# Patient Record
Sex: MALE | Race: WHITE | NOT HISPANIC OR LATINO
[De-identification: names, ages, dates, MRNs, and addresses within clinical notes are randomized per-mention and may not be internally consistent; named-entity substitution may affect disease eponyms.]

---

## 2017-09-07 ENCOUNTER — RESULT REVIEW (OUTPATIENT)
Age: 77
End: 2017-09-07

## 2022-11-18 ENCOUNTER — OFFICE (OUTPATIENT)
Dept: URBAN - METROPOLITAN AREA CLINIC 70 | Facility: CLINIC | Age: 82
Setting detail: OPHTHALMOLOGY
End: 2022-11-18
Payer: MEDICARE

## 2022-11-18 DIAGNOSIS — H35.373: ICD-10-CM

## 2022-11-18 DIAGNOSIS — H16.223: ICD-10-CM

## 2022-11-18 DIAGNOSIS — H44.23: ICD-10-CM

## 2022-11-18 DIAGNOSIS — H31.002: ICD-10-CM

## 2022-11-18 DIAGNOSIS — H35.3131: ICD-10-CM

## 2022-11-18 DIAGNOSIS — Z96.1: ICD-10-CM

## 2022-11-18 DIAGNOSIS — H52.7: ICD-10-CM

## 2022-11-18 DIAGNOSIS — H40.013: ICD-10-CM

## 2022-11-18 DIAGNOSIS — H33.8: ICD-10-CM

## 2022-11-18 DIAGNOSIS — H35.54: ICD-10-CM

## 2022-11-18 PROCEDURE — 92014 COMPRE OPH EXAM EST PT 1/>: CPT | Performed by: OPHTHALMOLOGY

## 2022-11-18 PROCEDURE — 92015 DETERMINE REFRACTIVE STATE: CPT | Performed by: OPHTHALMOLOGY

## 2022-11-18 PROCEDURE — 92250 FUNDUS PHOTOGRAPHY W/I&R: CPT | Performed by: OPHTHALMOLOGY

## 2022-11-18 ASSESSMENT — REFRACTION_AUTOREFRACTION
OD_CYLINDER: -1.50
OD_SPHERE: -1.75
OS_SPHERE: -2.75
OD_AXIS: 87
OS_AXIS: 124
OS_CYLINDER: -1.25

## 2022-11-18 ASSESSMENT — TONOMETRY
OS_IOP_MMHG: 16
OD_IOP_MMHG: 17

## 2022-11-18 ASSESSMENT — SPHEQUIV_DERIVED
OS_SPHEQUIV: -3.625
OD_SPHEQUIV: -2.75
OD_SPHEQUIV: -2.5
OS_SPHEQUIV: -3.5
OS_SPHEQUIV: -3.375
OD_SPHEQUIV: -2.75

## 2022-11-18 ASSESSMENT — AXIALLENGTH_DERIVED
OS_AL: 25.1399
OD_AL: 24.7143
OD_AL: 24.7143
OS_AL: 25.1953
OD_AL: 24.6079
OS_AL: 25.251

## 2022-11-18 ASSESSMENT — KERATOMETRY
OD_AXISANGLE_DEGREES: 175
OS_K2POWER_DIOPTERS: 43.50
OS_K1POWER_DIOPTERS: 42.75
OD_K1POWER_DIOPTERS: 42.75
OD_K2POWER_DIOPTERS: 44.25
OS_AXISANGLE_DEGREES: 072

## 2022-11-18 ASSESSMENT — REFRACTION_MANIFEST
OD_ADD: +2.75
OD_VA2: 20/30
OS_VA2: 20/30
OS_SPHERE: -3.00
OU_VA: 20/40
OS_ADD: +2.75
OD_CYLINDER: -1.50
OD_ADD: +2.50
OD_SPHERE: -2.00
OD_VA1: 20/40+2
OD_AXIS: 075
OD_CYLINDER: -1.50
OS_VA1: 20/50+2
OD_VA1: 20/40+2
OD_AXIS: 90
OS_AXIS: 125
OS_CYLINDER: -1.00
OD_SPHERE: -2.00
OS_VA1: 20/40
OS_AXIS: 140
OS_SPHERE: -3.25
OS_CYLINDER: -0.75
OS_ADD: +2.50

## 2022-11-18 ASSESSMENT — REFRACTION_CURRENTRX
OS_VPRISM_DIRECTION: BF
OD_AXIS: 73
OD_AXIS: 70
OS_CYLINDER: -0.50
OS_AXIS: 141
OD_VPRISM_DIRECTION: BF
OS_OVR_VA: 20/
OD_SPHERE: -2.00
OD_VPRISM_DIRECTION: BF
OS_OVR_VA: 20/
OS_AXIS: 140
OD_OVR_VA: 20/
OS_ADD: +2.50
OD_OVR_VA: 20/
OS_CYLINDER: -0.75
OD_CYLINDER: -1.50
OD_ADD: +2.50
OD_CYLINDER: -1.00
OD_ADD: +2.50
OS_SPHERE: -3.25
OD_SPHERE: -2.00
OS_SPHERE: -3.25
OS_ADD: +2.50

## 2022-11-18 ASSESSMENT — SUPERFICIAL PUNCTATE KERATITIS (SPK)
OD_SPK: 1+
OS_SPK: 1+

## 2022-11-18 ASSESSMENT — VISUAL ACUITY
OS_BCVA: 20/60+2
OD_BCVA: 20/60

## 2022-11-18 ASSESSMENT — CONFRONTATIONAL VISUAL FIELD TEST (CVF)
OS_FINDINGS: FULL
OD_FINDINGS: FULL

## 2023-11-21 ENCOUNTER — OFFICE (OUTPATIENT)
Dept: URBAN - METROPOLITAN AREA CLINIC 109 | Facility: CLINIC | Age: 83
Setting detail: OPHTHALMOLOGY
End: 2023-11-21
Payer: MEDICARE

## 2023-11-21 DIAGNOSIS — Z96.1: ICD-10-CM

## 2023-11-21 DIAGNOSIS — H35.373: ICD-10-CM

## 2023-11-21 DIAGNOSIS — H33.8: ICD-10-CM

## 2023-11-21 DIAGNOSIS — H35.3132: ICD-10-CM

## 2023-11-21 DIAGNOSIS — H40.023: ICD-10-CM

## 2023-11-21 PROCEDURE — 76514 ECHO EXAM OF EYE THICKNESS: CPT | Performed by: OPHTHALMOLOGY

## 2023-11-21 PROCEDURE — 92014 COMPRE OPH EXAM EST PT 1/>: CPT | Performed by: OPHTHALMOLOGY

## 2023-11-21 ASSESSMENT — REFRACTION_CURRENTRX
OD_AXIS: 70
OD_OVR_VA: 20/
OS_AXIS: 130
OD_OVR_VA: 20/
OS_AXIS: 140
OS_CYLINDER: -1.25
OS_OVR_VA: 20/
OS_SPHERE: -3.25
OD_ADD: +2.50
OS_ADD: +2.50
OS_SPHERE: -3.50
OD_CYLINDER: -1.75
OD_AXIS: 70
OS_OVR_VA: 20/
OS_ADD: +2.50
OD_VPRISM_DIRECTION: BF
OD_SPHERE: -2.00
OD_SPHERE: -2.00
OD_VPRISM_DIRECTION: BF
OD_CYLINDER: -1.50
OS_CYLINDER: -0.75
OS_VPRISM_DIRECTION: BF
OD_ADD: +2.50

## 2023-11-21 ASSESSMENT — REFRACTION_MANIFEST
OS_CYLINDER: -0.50
OS_VA1: 20/40
OD_VA2: 20/30
OS_ADD: +2.50
OD_ADD: +2.50
OD_AXIS: 90
OD_VA1: 20/40+
OS_ADD: +2.75
OU_VA: 20/40
OD_CYLINDER: -1.50
OD_ADD: +2.75
OS_SPHERE: -3.00
OD_SPHERE: -1.75
OS_VA2: 20/30
OS_AXIS: 130

## 2023-11-21 ASSESSMENT — REFRACTION_AUTOREFRACTION
OD_CYLINDER: -1.50
OS_CYLINDER: -0.50
OS_AXIS: 131
OD_AXIS: 90
OS_SPHERE: -3.00
OD_SPHERE: -1.75

## 2023-11-21 ASSESSMENT — CONFRONTATIONAL VISUAL FIELD TEST (CVF)
OS_FINDINGS: FULL
OD_FINDINGS: FULL

## 2023-11-21 ASSESSMENT — SPHEQUIV_DERIVED
OS_SPHEQUIV: -3.25
OD_SPHEQUIV: -2.5
OD_SPHEQUIV: -2.5
OS_SPHEQUIV: -3.25

## 2024-08-29 PROBLEM — Z87.09 HISTORY OF SINUSITIS: Status: RESOLVED | Noted: 2024-08-29 | Resolved: 2024-08-29

## 2024-08-29 PROBLEM — Z86.69 HISTORY OF OBSTRUCTIVE SLEEP APNEA: Status: RESOLVED | Noted: 2024-08-29 | Resolved: 2024-08-29

## 2024-08-29 PROBLEM — Z85.828: Status: RESOLVED | Noted: 2024-08-29 | Resolved: 2024-08-29

## 2024-09-03 ENCOUNTER — APPOINTMENT (OUTPATIENT)
Dept: OTOLARYNGOLOGY | Facility: CLINIC | Age: 84
End: 2024-09-03

## 2024-09-03 VITALS
OXYGEN SATURATION: 96 % | HEIGHT: 74 IN | SYSTOLIC BLOOD PRESSURE: 125 MMHG | DIASTOLIC BLOOD PRESSURE: 64 MMHG | HEART RATE: 69 BPM | BODY MASS INDEX: 33.37 KG/M2 | WEIGHT: 260 LBS

## 2024-09-03 DIAGNOSIS — J39.2 OTHER DISEASES OF PHARYNX: ICD-10-CM

## 2024-09-03 DIAGNOSIS — N40.0 BENIGN PROSTATIC HYPERPLASIA WITHOUT LOWER URINARY TRACT SYMPMS: ICD-10-CM

## 2024-09-03 DIAGNOSIS — Z87.09 PERSONAL HISTORY OF OTHER DISEASES OF THE RESPIRATORY SYSTEM: ICD-10-CM

## 2024-09-03 DIAGNOSIS — K86.2 CYST OF PANCREAS: ICD-10-CM

## 2024-09-03 DIAGNOSIS — Z86.69 PERSONAL HISTORY OF OTHER DISEASES OF THE NERVOUS SYSTEM AND SENSE ORGANS: ICD-10-CM

## 2024-09-03 DIAGNOSIS — Z86.79 PERSONAL HISTORY OF OTHER DISEASES OF THE CIRCULATORY SYSTEM: ICD-10-CM

## 2024-09-03 DIAGNOSIS — Z78.9 OTHER SPECIFIED HEALTH STATUS: ICD-10-CM

## 2024-09-03 DIAGNOSIS — Z85.828 PERSONAL HISTORY OF OTHER MALIGNANT NEOPLASM OF SKIN: ICD-10-CM

## 2024-09-03 PROCEDURE — 99203 OFFICE O/P NEW LOW 30 MIN: CPT | Mod: 25

## 2024-09-03 PROCEDURE — 31575 DIAGNOSTIC LARYNGOSCOPY: CPT

## 2024-09-03 RX ORDER — SACUBITRIL AND VALSARTAN 49; 51 MG/1; MG/1
TABLET, FILM COATED ORAL
Refills: 0 | Status: ACTIVE | COMMUNITY

## 2024-09-03 RX ORDER — FINASTERIDE 1 MG/1
TABLET ORAL
Refills: 0 | Status: ACTIVE | COMMUNITY

## 2024-09-03 RX ORDER — DULOXETINE HYDROCHLORIDE 30 MG/1
CAPSULE, DELAYED RELEASE ORAL
Refills: 0 | Status: ACTIVE | COMMUNITY

## 2024-09-03 RX ORDER — LOSARTAN POTASSIUM 100 MG/1
TABLET, FILM COATED ORAL
Refills: 0 | Status: ACTIVE | COMMUNITY

## 2024-09-03 RX ORDER — RIVAROXABAN 2.5 MG/1
TABLET, FILM COATED ORAL
Refills: 0 | Status: ACTIVE | COMMUNITY

## 2024-09-03 RX ORDER — TAMSULOSIN HYDROCHLORIDE 0.4 MG/1
CAPSULE ORAL
Refills: 0 | Status: ACTIVE | COMMUNITY

## 2024-09-03 NOTE — HISTORY OF PRESENT ILLNESS
[de-identified] : Pt with hx of SCCa of right central scalp  on RT 29/30 ( complete  9/4/2024) by Roel Young and refer by Janna Chapa for pet ct uptake on 8/2024 showed right oropharynx reveal mild asymmetric uptake. PT has no throat pain, swallow issue.   non smoker

## 2024-09-03 NOTE — HISTORY OF PRESENT ILLNESS
[de-identified] : Pt with hx of SCCa of right central scalp  on RT 29/30 ( complete  9/4/2024) by Roel Young and refer by Janna Chapa for pet ct uptake on 8/2024 showed right oropharynx reveal mild asymmetric uptake. PT has no throat pain, swallow issue.   non smoker

## 2024-09-03 NOTE — CONSULT LETTER
[Dear  ___] : Dear ~OLU, [Consult Letter:] : I had the pleasure of evaluating your patient, [unfilled]. [Please see my note below.] : Please see my note below. [Consult Closing:] : Thank you very much for allowing me to participate in the care of this patient.  If you have any questions, please do not hesitate to contact me. [Sincerely,] : Sincerely, [FreeTextEntry2] : Ms. Saldivar Tristan [address] [facility] [address],  [place] [phone] [FreeTextEntry3] : Dev

## 2024-09-03 NOTE — PROCEDURE
[Gag Reflex] : gag reflex preventing mirror examination [None] : none [Flexible Endoscope] : examined with the flexible endoscope [Serial Number: ___] : Serial Number: [unfilled] [de-identified] : After patient consents, a FFL is performed.  No lesions in the NPx, OPx, HPx or larynx.  VC are mobile, airway patent.

## 2024-09-03 NOTE — CONSULT LETTER
[Dear  ___] : Dear ~OLU, [Consult Letter:] : I had the pleasure of evaluating your patient, [unfilled]. [Please see my note below.] : Please see my note below. [Consult Closing:] : Thank you very much for allowing me to participate in the care of this patient.  If you have any questions, please do not hesitate to contact me. [Sincerely,] : Sincerely, [FreeTextEntry2] : Ms. Saldivar Tristan [address] [facility] [address],  [place] [phone] [FreeTextEntry3] : Dev

## 2024-09-03 NOTE — PROCEDURE
[Gag Reflex] : gag reflex preventing mirror examination [None] : none [Flexible Endoscope] : examined with the flexible endoscope [Serial Number: ___] : Serial Number: [unfilled] [de-identified] : After patient consents, a FFL is performed.  No lesions in the NPx, OPx, HPx or larynx.  VC are mobile, airway patent.

## 2024-11-05 ENCOUNTER — APPOINTMENT (OUTPATIENT)
Dept: OTOLARYNGOLOGY | Facility: CLINIC | Age: 84
End: 2024-11-05

## 2025-03-31 ENCOUNTER — NON-APPOINTMENT (OUTPATIENT)
Age: 85
End: 2025-03-31

## 2025-03-31 ENCOUNTER — APPOINTMENT (OUTPATIENT)
Dept: PODIATRY | Facility: CLINIC | Age: 85
End: 2025-03-31
Payer: MEDICARE

## 2025-03-31 DIAGNOSIS — H91.90 UNSPECIFIED HEARING LOSS, UNSPECIFIED EAR: ICD-10-CM

## 2025-03-31 DIAGNOSIS — F32.A DEPRESSION, UNSPECIFIED: ICD-10-CM

## 2025-03-31 DIAGNOSIS — I49.9 CARDIAC ARRHYTHMIA, UNSPECIFIED: ICD-10-CM

## 2025-03-31 DIAGNOSIS — Z78.9 OTHER SPECIFIED HEALTH STATUS: ICD-10-CM

## 2025-03-31 DIAGNOSIS — I70.245 ATHEROSCLEROSIS OF NATIVE ARTERIES OF RIGHT LEG WITH ULCERATION OF OTHER PART OF FOOT: ICD-10-CM

## 2025-03-31 DIAGNOSIS — I70.235 ATHEROSCLEROSIS OF NATIVE ARTERIES OF RIGHT LEG WITH ULCERATION OF OTHER PART OF FOOT: ICD-10-CM

## 2025-03-31 DIAGNOSIS — R26.9 UNSPECIFIED ABNORMALITIES OF GAIT AND MOBILITY: ICD-10-CM

## 2025-03-31 DIAGNOSIS — E78.5 HYPERLIPIDEMIA, UNSPECIFIED: ICD-10-CM

## 2025-03-31 DIAGNOSIS — L89.891 PRESSURE ULCER OF OTHER SITE, STAGE 1: ICD-10-CM

## 2025-03-31 DIAGNOSIS — K76.0 FATTY (CHANGE OF) LIVER, NOT ELSEWHERE CLASSIFIED: ICD-10-CM

## 2025-03-31 PROCEDURE — 99204 OFFICE O/P NEW MOD 45 MIN: CPT | Mod: 25

## 2025-03-31 PROCEDURE — 97597 DBRDMT OPN WND 1ST 20 CM/<: CPT

## 2025-03-31 RX ORDER — LOSARTAN POTASSIUM 100 MG/1
100 TABLET, FILM COATED ORAL
Refills: 0 | Status: ACTIVE | COMMUNITY

## 2025-03-31 RX ORDER — ATORVASTATIN CALCIUM 80 MG/1
TABLET, FILM COATED ORAL
Refills: 0 | Status: ACTIVE | COMMUNITY

## 2025-04-02 PROBLEM — L89.891: Status: ACTIVE | Noted: 2025-04-02

## 2025-04-02 PROBLEM — L89.891 PRESSURE INJURY OF DORSUM OF FOOT, STAGE 1: Status: ACTIVE | Noted: 2025-04-02

## 2025-04-02 PROBLEM — I70.235 ATHEROSCLEROSIS OF NATIVE ARTERY OF BOTH LOWER EXTREMITIES WITH BILATERAL ULCERATION OF OTHER PART OF FEET: Status: ACTIVE | Noted: 2025-04-02

## 2025-04-18 ENCOUNTER — APPOINTMENT (OUTPATIENT)
Dept: PODIATRY | Facility: CLINIC | Age: 85
End: 2025-04-18

## 2025-04-18 DIAGNOSIS — B35.1 TINEA UNGUIUM: ICD-10-CM

## 2025-04-18 DIAGNOSIS — L89.891 PRESSURE ULCER OF OTHER SITE, STAGE 1: ICD-10-CM

## 2025-04-18 DIAGNOSIS — I70.245 ATHEROSCLEROSIS OF NATIVE ARTERIES OF RIGHT LEG WITH ULCERATION OF OTHER PART OF FOOT: ICD-10-CM

## 2025-04-18 DIAGNOSIS — I70.235 ATHEROSCLEROSIS OF NATIVE ARTERIES OF RIGHT LEG WITH ULCERATION OF OTHER PART OF FOOT: ICD-10-CM

## 2025-04-18 PROCEDURE — 11721 DEBRIDE NAIL 6 OR MORE: CPT | Mod: 59,Q7

## 2025-04-18 PROCEDURE — 97597 DBRDMT OPN WND 1ST 20 CM/<: CPT

## 2025-04-25 PROBLEM — B35.1 TINEA UNGUIUM: Status: ACTIVE | Noted: 2025-04-25

## 2025-05-02 ENCOUNTER — APPOINTMENT (OUTPATIENT)
Dept: PODIATRY | Facility: CLINIC | Age: 85
End: 2025-05-02
Payer: MEDICARE

## 2025-05-02 DIAGNOSIS — S90.425A BLISTER (NONTHERMAL), LEFT LESSER TOE(S), INITIAL ENCOUNTER: ICD-10-CM

## 2025-05-02 DIAGNOSIS — L89.891 PRESSURE ULCER OF OTHER SITE, STAGE 1: ICD-10-CM

## 2025-05-02 PROCEDURE — 97597 DBRDMT OPN WND 1ST 20 CM/<: CPT | Mod: TA

## 2025-05-02 PROCEDURE — 10140 I&D HMTMA SEROMA/FLUID COLLJ: CPT | Mod: 59,T1

## 2025-05-16 ENCOUNTER — APPOINTMENT (OUTPATIENT)
Dept: PODIATRY | Facility: CLINIC | Age: 85
End: 2025-05-16
Payer: MEDICARE

## 2025-05-16 DIAGNOSIS — L89.891 PRESSURE ULCER OF OTHER SITE, STAGE 1: ICD-10-CM

## 2025-05-16 PROCEDURE — 97597 DBRDMT OPN WND 1ST 20 CM/<: CPT

## 2025-06-02 ENCOUNTER — APPOINTMENT (OUTPATIENT)
Dept: PODIATRY | Facility: CLINIC | Age: 85
End: 2025-06-02
Payer: MEDICARE

## 2025-06-02 DIAGNOSIS — L89.891 PRESSURE ULCER OF OTHER SITE, STAGE 1: ICD-10-CM

## 2025-06-02 PROCEDURE — 97597 DBRDMT OPN WND 1ST 20 CM/<: CPT

## 2025-06-23 ENCOUNTER — APPOINTMENT (OUTPATIENT)
Dept: PODIATRY | Facility: CLINIC | Age: 85
End: 2025-06-23